# Patient Record
Sex: FEMALE | Race: WHITE
[De-identification: names, ages, dates, MRNs, and addresses within clinical notes are randomized per-mention and may not be internally consistent; named-entity substitution may affect disease eponyms.]

---

## 2019-08-22 ENCOUNTER — HOSPITAL ENCOUNTER (EMERGENCY)
Dept: HOSPITAL 62 - ER | Age: 19
Discharge: HOME | End: 2019-08-22
Payer: OTHER GOVERNMENT

## 2019-08-22 VITALS — SYSTOLIC BLOOD PRESSURE: 126 MMHG | DIASTOLIC BLOOD PRESSURE: 70 MMHG

## 2019-08-22 DIAGNOSIS — B96.89: ICD-10-CM

## 2019-08-22 DIAGNOSIS — N76.0: ICD-10-CM

## 2019-08-22 DIAGNOSIS — Z88.2: ICD-10-CM

## 2019-08-22 DIAGNOSIS — Z91.040: ICD-10-CM

## 2019-08-22 DIAGNOSIS — R31.9: ICD-10-CM

## 2019-08-22 DIAGNOSIS — N93.9: Primary | ICD-10-CM

## 2019-08-22 LAB
ADD MANUAL DIFF: NO
ALBUMIN SERPL-MCNC: 4.5 G/DL (ref 3.5–5)
ALP SERPL-CCNC: 86 U/L (ref 38–126)
ANION GAP SERPL CALC-SCNC: 8 MMOL/L (ref 5–19)
APPEARANCE UR: (no result)
APTT PPP: YELLOW S
AST SERPL-CCNC: 44 U/L (ref 14–36)
BACTERIA (WET MOUNT): (no result)
BASOPHILS # BLD AUTO: 0.1 10^3/UL (ref 0–0.2)
BASOPHILS NFR BLD AUTO: 0.9 % (ref 0–2)
BILIRUB DIRECT SERPL-MCNC: 0.4 MG/DL (ref 0–0.4)
BILIRUB SERPL-MCNC: 0.4 MG/DL (ref 0.2–1.3)
BILIRUB UR QL STRIP: NEGATIVE
BUN SERPL-MCNC: 9 MG/DL (ref 7–20)
CALCIUM: 9.5 MG/DL (ref 8.4–10.2)
CHLAM PCR: NOT DETECTED
CHLORIDE SERPL-SCNC: 105 MMOL/L (ref 98–107)
CO2 SERPL-SCNC: 27 MMOL/L (ref 22–30)
EOSINOPHIL # BLD AUTO: 0.1 10^3/UL (ref 0–0.6)
EOSINOPHIL NFR BLD AUTO: 1.1 % (ref 0–6)
ERYTHROCYTE [DISTWIDTH] IN BLOOD BY AUTOMATED COUNT: 12.7 % (ref 11.5–14)
GLUCOSE SERPL-MCNC: 114 MG/DL (ref 75–110)
GLUCOSE UR STRIP-MCNC: NEGATIVE MG/DL
HCT VFR BLD CALC: 41.6 % (ref 36–47)
HGB BLD-MCNC: 14.1 G/DL (ref 12–15.5)
KETONES UR STRIP-MCNC: NEGATIVE MG/DL
LYMPHOCYTES # BLD AUTO: 4.5 10^3/UL (ref 0.5–4.7)
LYMPHOCYTES NFR BLD AUTO: 38.7 % (ref 13–45)
MCH RBC QN AUTO: 29 PG (ref 27–33.4)
MCHC RBC AUTO-ENTMCNC: 34 G/DL (ref 32–36)
MCV RBC AUTO: 85 FL (ref 80–97)
MONOCYTES # BLD AUTO: 0.6 10^3/UL (ref 0.1–1.4)
MONOCYTES NFR BLD AUTO: 4.7 % (ref 3–13)
NEUTROPHILS # BLD AUTO: 6.4 10^3/UL (ref 1.7–8.2)
NEUTS SEG NFR BLD AUTO: 54.6 % (ref 42–78)
NITRITE UR QL STRIP: NEGATIVE
PH UR STRIP: 5 [PH] (ref 5–9)
PLATELET # BLD: 221 10^3/UL (ref 150–450)
POTASSIUM SERPL-SCNC: 4.2 MMOL/L (ref 3.6–5)
PROT SERPL-MCNC: 7.5 G/DL (ref 6.3–8.2)
PROT UR STRIP-MCNC: NEGATIVE MG/DL
RBC # BLD AUTO: 4.88 10^6/UL (ref 3.72–5.28)
RBCS (WET MOUNT): (no result)
SP GR UR STRIP: 1.02
T.VAGINALIS (WET MOUNT): (no result)
TOTAL CELLS COUNTED % (AUTO): 100 %
UROBILINOGEN UR-MCNC: NEGATIVE MG/DL (ref ?–2)
WBC # BLD AUTO: 11.6 10^3/UL (ref 4–10.5)
WBCS (WET MOUNT): (no result)
YEAST (WET MOUNT): (no result)

## 2019-08-22 PROCEDURE — 87491 CHLMYD TRACH DNA AMP PROBE: CPT

## 2019-08-22 PROCEDURE — 93976 VASCULAR STUDY: CPT

## 2019-08-22 PROCEDURE — 87591 N.GONORRHOEAE DNA AMP PROB: CPT

## 2019-08-22 PROCEDURE — 81001 URINALYSIS AUTO W/SCOPE: CPT

## 2019-08-22 PROCEDURE — 84702 CHORIONIC GONADOTROPIN TEST: CPT

## 2019-08-22 PROCEDURE — 85025 COMPLETE CBC W/AUTO DIFF WBC: CPT

## 2019-08-22 PROCEDURE — 36415 COLL VENOUS BLD VENIPUNCTURE: CPT

## 2019-08-22 PROCEDURE — 87210 SMEAR WET MOUNT SALINE/INK: CPT

## 2019-08-22 PROCEDURE — 80053 COMPREHEN METABOLIC PANEL: CPT

## 2019-08-22 PROCEDURE — 87086 URINE CULTURE/COLONY COUNT: CPT

## 2019-08-22 PROCEDURE — 76830 TRANSVAGINAL US NON-OB: CPT

## 2019-08-22 NOTE — ER DOCUMENT REPORT
ED Medical Screen (RME)





- General


Chief Complaint: Vaginal Bleeding


Stated Complaint: VAGINAL BLEEDING


Time Seen by Provider: 19 15:46


Mode of Arrival: Ambulatory


Information source: Patient


Notes: 





19-year-old female presents to ED for complaint of note menstrual cycle in 3 

months.  She states last night she started having abdominal cramping and light 

bleeding.  She states she has blood when she wipes but not on the pad that she 

has on.  She states she has had some positive and some negative urine pregnancy 

test.  Medical history she has had her tongue clipped tonsils removed wisdom 

teeth removed ganglion cyst removed and a .  She is alert oriented 

respirations regular and unlabored speaking in full sentences walks with even 

steady gait.














I have greeted and performed a rapid initial assessment of this patient.  A 

comprehensive ED assessment and evaluation of the patient, analysis of test 

results and completion of medical decision making process will be conducted by 

an additional ED providers.














Dictation of this chart was performed using voice recognition software; 

therefore, there may be some unintended grammatical errors.





- Related Data


Allergies/Adverse Reactions: 


                                        





Latex, Natural Rubber Allergy (Verified 19 15:38)


   


Sulfa (Sulfonamide Antibiotics) Allergy (Verified 19 15:38)


   











Physical Exam





- Vital signs


Vitals: 





                                        











Temp Pulse Resp BP Pulse Ox


 


 98.4 F   86   15   150/70 H  95 


 


 19 15:40  19 15:40  19 15:40  19 15:40  19 15:40














Course





- Vital Signs


Vital signs: 





                                        











Temp Pulse Resp BP Pulse Ox


 


 98.4 F   86   15   150/70 H  95 


 


 19 15:40  19 15:40  19 15:40  19 15:40  19 15:40

## 2019-08-22 NOTE — RADIOLOGY REPORT (SQ)
EXAM DESCRIPTION:  U/S NON OB PEL TV W/DOPPLER



COMPLETED DATE/TIME:  8/22/2019 5:50 pm



REASON FOR STUDY:  pelvic pain and vag bleeding, unsure preg



COMPARISON:  None.



TECHNIQUE:  Dynamic and static grayscale images acquired of the pelvis via transvaginal approach and 
recorded on PACS. Additional selected color Doppler and spectral images recorded.



LIMITATIONS:  None.



FINDINGS:  UTERUS: Contour normal.  No mass.

ENDOMETRIAL STRIPE: No focal or generalized thickening. No masses.

CERVIX: No nabothian cysts.

RIGHT OVARY AND DOPPLER: Normal size. No worrisome masses. Normal arterial vascular flow without evid
ence for torsion.

LEFT OVARY AND DOPPLER: Nonvisualized.

FREE FLUID: None noted.

OTHER: No other significant finding.

MEASUREMENTS:

UTERUS: 7.4 x 4.5 x 3.3 cm.

ENDOMETRIAL STRIPE: 4 mm

RIGHT OVARY: 3.6 x 2.4 x 1.5 cm

LEFT OVARY: Nonvisualized.



IMPRESSION:  No ultrasound abnormality of the pelvis to explain pelvic pain or vaginal bleeding.  The
 left ovary is nonvisualized.



TECHNICAL DOCUMENTATION:  JOB ID:  [id number]

 [employer]- All Rights Reserved                          [id number]



Reading location - IP/workstation name: [email or web address]

## 2019-08-22 NOTE — ER DOCUMENT REPORT
ED General





- General


Chief Complaint: Vaginal Bleeding


Stated Complaint: VAGINAL BLEEDING


Time Seen by Provider: 19 15:46


Mode of Arrival: Ambulatory





- HPI


Notes: 








19-year-old female  presents the ED for evaluation of vaginal bleeding and 

cramping that she noticed started last night, states she has not had a menstrual

cycle in over 3 months.  Reports she has had a history of some positive some 

negative urinary pregnancy test within the last 3 months.  Has not tried any 

over-the-counter medications.  Is not been seen by her provider for this issue. 

Denies fevers, chills,  chest pain,palpitations,  shortness of breath, dyspnea, 

nausea, vomiting, diarrhea, abdominal pain, hematuria,blurred vision, double 

vision, loss of vision, speech changes, LH, dizziness, syncope, headaches, 

wheezing, ST, URI, neck pain, weakness, bowel or bladder dysfunction, saddle 

anesthesia, numbness or tingling in bilateral upper or lower extremities 

equally, muscle paralysis, weakness in bilateral upper or lower extremities 

equally or rash.





- Related Data


Allergies/Adverse Reactions: 


                                        





Latex, Natural Rubber Allergy (Verified 19 15:38)


   


Sulfa (Sulfonamide Antibiotics) Allergy (Verified 19 15:38)


   











Past Medical History





- General


Information source: Patient


Last Menstrual Period: 19





- Social History


Smoking Status: Never Smoker


Frequency of alcohol use: Rare


Drug Abuse: None


Family History: Reviewed & Not Pertinent


Patient has suicidal ideation: No


Patient has homicidal ideation: No


Renal/ Medical History: Denies: Hx Peritoneal Dialysis


Past Surgical History: Reports: Hx  Section, Hx Oral Surgery, Hx 

Tonsillectomy





Review of Systems





- Review of Systems


Constitutional: No symptoms reported


EENT: No symptoms reported


Cardiovascular: No symptoms reported


Respiratory: No symptoms reported


Gastrointestinal: No symptoms reported


Genitourinary: No symptoms reported


Female Genitourinary: See HPI


Musculoskeletal: No symptoms reported


Skin: No symptoms reported


Hematologic/Lymphatic: No symptoms reported


Neurological/Psychological: No symptoms reported





Physical Exam





- Vital signs


Vitals: 


                                        











Temp Pulse Resp BP Pulse Ox


 


 98.4 F   86   15   150/70 H  95 


 


 19 15:40  19 15:40  19 15:40  19 15:40  19 15:40














- Notes


Notes: 





PHYSICAL EXAMINATION:





GENERAL: Well-appearing, well-nourished and in no acute distress.





HEAD: Atraumatic, normocephalic.





EYES: Pupils equal round and reactive to light, extraocular movements intact, 

conjunctiva are normal.





ENT: Nares patent, oropharynx clear without exudates.  Moist mucous membranes.





NECK: Normal range of motion, supple without lymphadenopathy





LUNGS: Breath sounds clear to auscultation bilaterally and equal.  No wheezes 

rales or rhonchi.





HEART: Regular rate and rhythm without murmurs





ABDOMEN: Soft, nontender, nondistended abdomen.  No guarding, no rebound.  No 

masses appreciated.





Female : External genitalia without erythema, exudate or discharge. Vaginal 

vault is without discharge. Cervix is of normal color without lesion.  Uterus is

noted to be of normal size and nontender. No cervical motion tenderness is seen.

No masses are palpated. scant blood in the vaginal vault without clots, os 

closed, no adnexal tenderness or mass 





Musculoskeletal: Normal range of motion, no pitting or edema.  No cyanosis.





NEUROLOGICAL: Cranial nerves grossly intact.  Normal speech, normal gait.  

Normal sensory, motor exams





PSYCH: Normal mood, normal affect.





SKIN: Warm, Dry, normal turgor, no rashes or lesions noted.





Course





- Re-evaluation


Re-evalutation: 





19 18:37


Afebrile vital stable no distress.  Nurse's notes reviewed.  CBC negative for 

leukocytosis or anemia, CMP negative and unremarkable for any hepatic or renal 

dysfunction, no electrolyte disturbances.  Urinalysis does show some 

leukoesterase, noted hematuria.  Ultrasound negative for ectopic pregnancy, we 

will visualize left ovary.  Beta hCG negative. wet moumt does show BV, negative 

for yeast or trichomonas.  GC is pending.  Follow-up with OB/GYN and primary 

care provider as needed.  After performing a Medical Screening Examination, I 

estimate there is LOW risk for ACUTE APPENDICITIS, BOWEL OBSTRUCTION, ACUTE 

CHOLECYSTITIS, PERFORATED DIVERTICULITIS, INCARCERATED HERNIA, PANCREATITIS, 

PELVIC INFLAMMATORY DISEASE, PERFORATED ULCER, ECTOPIC PREGNANCY, or TUBO-

OVARIAN ABSCESS, thus I consider the discharge disposition reasonable. Also, 

there is no evidence or peritonitis, sepsis, or toxicity. I have reevaluated 

this patient multiple times and no significant life threatening changes are 

noted. The patient and I have discussed the diagnosis and risks, and we agree w

ith discharging home with close follow-up with the understanding that symptoms 

and presentations can change. We also discussed returning to the Emergency 

Department immediately if new or worsening symptoms occur. We have discussed the

symptoms which are most concerning (e.g., bloody stool, fever, changing or 

worsening pain, vomiting) that necessitate immediate return.





- Vital Signs


Vital signs: 


                                        











Temp Pulse Resp BP Pulse Ox


 


 98.4 F   86   15   150/70 H  95 


 


 19 15:40  19 15:40  19 15:40  19 15:40  19 15:40














- Laboratory


Result Diagrams: 


                                 19 16:07





                                 19 16:07


Laboratory results interpreted by me: 


                                        











  19





  16:07 16:07 16:07


 


WBC  11.6 H  


 


Glucose   114 H 


 


AST   44 H 


 


Urine Blood    LARGE H


 


Ur Leukocyte Esterase    TRACE H














Discharge





- Discharge


Clinical Impression: 


 Bacterial vaginitis, Vaginal bleeding





Condition: Stable


Disposition: HOME, SELF-CARE


Instructions:  Vaginal Bleeding ([facility]), Vaginosis, Bacterial ([facility])


Additional Instructions: 


Your wet mount show that you do have BV, take antibiotics as directed.  Do not 

drink while taking this medication to cause nausea or vomiting.  You are not 

pregnant.  Your ultrasound was normal.  Follow-up with an OB/GYN, referral has 

been given as well as your primary care provider.  Return immediately for any 

new or worsening symptoms.





Follow up with primary care provider, call tomorrow to make followup 

appointment.


Prescriptions: 


Metronidazole [Flagyl] 500 mg PO BID #14 tablet


Forms:  Return to Work


Referrals: 


SHANKAR LARA MD [ACTIVE STAFF] - Follow up as needed


JOANN DELCID MD [ACTIVE STAFF] - Follow up as needed